# Patient Record
Sex: FEMALE
[De-identification: names, ages, dates, MRNs, and addresses within clinical notes are randomized per-mention and may not be internally consistent; named-entity substitution may affect disease eponyms.]

---

## 2017-01-17 ENCOUNTER — APPOINTMENT (OUTPATIENT)
Dept: NEPHROLOGY | Facility: CLINIC | Age: 67
End: 2017-01-17

## 2017-01-17 VITALS
SYSTOLIC BLOOD PRESSURE: 150 MMHG | DIASTOLIC BLOOD PRESSURE: 80 MMHG | RESPIRATION RATE: 18 BRPM | WEIGHT: 125 LBS | HEART RATE: 80 BPM | BODY MASS INDEX: 18.46 KG/M2 | TEMPERATURE: 98 F

## 2017-03-27 ENCOUNTER — APPOINTMENT (OUTPATIENT)
Dept: NEPHROLOGY | Facility: CLINIC | Age: 67
End: 2017-03-27

## 2017-03-27 VITALS
WEIGHT: 128 LBS | DIASTOLIC BLOOD PRESSURE: 70 MMHG | SYSTOLIC BLOOD PRESSURE: 130 MMHG | BODY MASS INDEX: 18.9 KG/M2 | HEART RATE: 80 BPM | TEMPERATURE: 98 F

## 2017-06-27 ENCOUNTER — APPOINTMENT (OUTPATIENT)
Dept: NEPHROLOGY | Facility: CLINIC | Age: 67
End: 2017-06-27

## 2017-06-27 VITALS
SYSTOLIC BLOOD PRESSURE: 120 MMHG | TEMPERATURE: 98 F | RESPIRATION RATE: 18 BRPM | HEART RATE: 76 BPM | BODY MASS INDEX: 15.95 KG/M2 | WEIGHT: 108 LBS | DIASTOLIC BLOOD PRESSURE: 70 MMHG

## 2017-06-27 DIAGNOSIS — N60.01 SOLITARY CYST OF RIGHT BREAST: ICD-10-CM

## 2017-07-20 ENCOUNTER — RX RENEWAL (OUTPATIENT)
Age: 67
End: 2017-07-20

## 2017-07-28 ENCOUNTER — RX RENEWAL (OUTPATIENT)
Age: 67
End: 2017-07-28

## 2017-10-01 ENCOUNTER — TRANSCRIPTION ENCOUNTER (OUTPATIENT)
Age: 67
End: 2017-10-01

## 2017-10-03 ENCOUNTER — APPOINTMENT (OUTPATIENT)
Dept: NEPHROLOGY | Facility: CLINIC | Age: 67
End: 2017-10-03
Payer: COMMERCIAL

## 2017-10-03 VITALS
WEIGHT: 126 LBS | TEMPERATURE: 98 F | BODY MASS INDEX: 18.61 KG/M2 | DIASTOLIC BLOOD PRESSURE: 70 MMHG | RESPIRATION RATE: 18 BRPM | SYSTOLIC BLOOD PRESSURE: 110 MMHG

## 2017-10-03 PROCEDURE — 99214 OFFICE O/P EST MOD 30 MIN: CPT

## 2017-12-11 ENCOUNTER — RX RENEWAL (OUTPATIENT)
Age: 67
End: 2017-12-11

## 2018-01-31 ENCOUNTER — APPOINTMENT (OUTPATIENT)
Dept: NEPHROLOGY | Facility: CLINIC | Age: 68
End: 2018-01-31
Payer: COMMERCIAL

## 2018-01-31 VITALS
RESPIRATION RATE: 18 BRPM | TEMPERATURE: 98 F | WEIGHT: 126 LBS | DIASTOLIC BLOOD PRESSURE: 70 MMHG | HEART RATE: 66 BPM | SYSTOLIC BLOOD PRESSURE: 120 MMHG | BODY MASS INDEX: 18.61 KG/M2

## 2018-01-31 PROCEDURE — 99214 OFFICE O/P EST MOD 30 MIN: CPT

## 2018-02-06 ENCOUNTER — RX RENEWAL (OUTPATIENT)
Age: 68
End: 2018-02-06

## 2018-04-16 ENCOUNTER — RX RENEWAL (OUTPATIENT)
Age: 68
End: 2018-04-16

## 2018-05-08 ENCOUNTER — APPOINTMENT (OUTPATIENT)
Dept: NEPHROLOGY | Facility: CLINIC | Age: 68
End: 2018-05-08
Payer: COMMERCIAL

## 2018-05-08 VITALS
SYSTOLIC BLOOD PRESSURE: 120 MMHG | DIASTOLIC BLOOD PRESSURE: 70 MMHG | BODY MASS INDEX: 18.61 KG/M2 | HEART RATE: 76 BPM | TEMPERATURE: 98.6 F | WEIGHT: 126 LBS

## 2018-05-08 DIAGNOSIS — N28.89 HYPERTENSION SECONDARY TO OTHER RENAL DISORDERS: ICD-10-CM

## 2018-05-08 DIAGNOSIS — N39.0 URINARY TRACT INFECTION, SITE NOT SPECIFIED: ICD-10-CM

## 2018-05-08 DIAGNOSIS — I15.1 HYPERTENSION SECONDARY TO OTHER RENAL DISORDERS: ICD-10-CM

## 2018-05-08 DIAGNOSIS — R31.9 URINARY TRACT INFECTION, SITE NOT SPECIFIED: ICD-10-CM

## 2018-05-08 PROCEDURE — 99214 OFFICE O/P EST MOD 30 MIN: CPT

## 2018-05-08 RX ORDER — CIPROFLOXACIN HYDROCHLORIDE 250 MG/1
250 TABLET, FILM COATED ORAL
Qty: 10 | Refills: 3 | Status: DISCONTINUED | COMMUNITY
Start: 2017-01-17 | End: 2018-05-08

## 2018-05-08 RX ORDER — AZITHROMYCIN DIHYDRATE 250 MG/1
250 TABLET, FILM COATED ORAL
Qty: 1 | Refills: 1 | Status: DISCONTINUED | COMMUNITY
Start: 2017-03-27 | End: 2018-05-08

## 2018-05-08 RX ORDER — ESTRADIOL 1 MG/1
1 TABLET ORAL
Qty: 90 | Refills: 0 | Status: ACTIVE | COMMUNITY
Start: 2018-01-10

## 2018-05-08 RX ORDER — PROGESTERONE 200 MG/1
200 CAPSULE ORAL
Qty: 30 | Refills: 0 | Status: ACTIVE | COMMUNITY
Start: 2018-02-26

## 2018-06-16 ENCOUNTER — RX RENEWAL (OUTPATIENT)
Age: 68
End: 2018-06-16

## 2018-09-11 ENCOUNTER — APPOINTMENT (OUTPATIENT)
Dept: NEPHROLOGY | Facility: CLINIC | Age: 68
End: 2018-09-11
Payer: COMMERCIAL

## 2018-09-11 VITALS — SYSTOLIC BLOOD PRESSURE: 118 MMHG | HEART RATE: 75 BPM | RESPIRATION RATE: 16 BRPM | DIASTOLIC BLOOD PRESSURE: 65 MMHG

## 2018-09-11 PROCEDURE — 99214 OFFICE O/P EST MOD 30 MIN: CPT

## 2019-03-26 ENCOUNTER — APPOINTMENT (OUTPATIENT)
Dept: NEPHROLOGY | Facility: CLINIC | Age: 69
End: 2019-03-26

## 2019-05-24 ENCOUNTER — CLINICAL ADVICE (OUTPATIENT)
Age: 69
End: 2019-05-24

## 2019-11-19 ENCOUNTER — RX RENEWAL (OUTPATIENT)
Age: 69
End: 2019-11-19

## 2019-12-31 ENCOUNTER — TRANSCRIPTION ENCOUNTER (OUTPATIENT)
Age: 69
End: 2019-12-31

## 2020-02-18 ENCOUNTER — APPOINTMENT (OUTPATIENT)
Dept: NEPHROLOGY | Facility: CLINIC | Age: 70
End: 2020-02-18
Payer: COMMERCIAL

## 2020-02-18 DIAGNOSIS — E87.1 HYPO-OSMOLALITY AND HYPONATREMIA: ICD-10-CM

## 2020-02-18 DIAGNOSIS — R82.81 PYURIA: ICD-10-CM

## 2020-02-18 DIAGNOSIS — I10 ESSENTIAL (PRIMARY) HYPERTENSION: ICD-10-CM

## 2020-02-18 DIAGNOSIS — E78.00 PURE HYPERCHOLESTEROLEMIA, UNSPECIFIED: ICD-10-CM

## 2020-02-18 DIAGNOSIS — K21.9 GASTRO-ESOPHAGEAL REFLUX DISEASE W/OUT ESOPHAGITIS: ICD-10-CM

## 2020-02-18 PROCEDURE — 99214 OFFICE O/P EST MOD 30 MIN: CPT

## 2020-02-18 NOTE — PHYSICAL EXAM
[General Appearance - In No Acute Distress] : in no acute distress [General Appearance - Alert] : alert [Sclera] : the sclera and conjunctiva were normal [Extraocular Movements] : extraocular movements were intact [PERRL With Normal Accommodation] : pupils were equal in size, round, and reactive to light [Oropharynx] : the oropharynx was normal [Outer Ear] : the ears and nose were normal in appearance [Neck Appearance] : the appearance of the neck was normal [Neck Cervical Mass (___cm)] : no neck mass was observed [Jugular Venous Distention Increased] : there was no jugular-venous distention [Thyroid Diffuse Enlargement] : the thyroid was not enlarged [Thyroid Nodule] : there were no palpable thyroid nodules [Heart Rate And Rhythm] : heart rate was normal and rhythm regular [Auscultation Breath Sounds / Voice Sounds] : lungs were clear to auscultation bilaterally [Murmurs] : no murmurs [Heart Sounds] : normal S1 and S2 [Heart Sounds Gallop] : no gallops [Edema] : there was no peripheral edema [Full Pulse] : the pedal pulses are present [Heart Sounds Pericardial Friction Rub] : no pericardial rub [Bowel Sounds] : normal bowel sounds [Abdomen Soft] : soft [Abdomen Mass (___ Cm)] : no abdominal mass palpated [Abdomen Tenderness] : non-tender [No CVA Tenderness] : no ~M costovertebral angle tenderness [No Spinal Tenderness] : no spinal tenderness [Involuntary Movements] : no involuntary movements were seen [Musculoskeletal - Swelling] : no joint swelling seen [Skin Color & Pigmentation] : normal skin color and pigmentation [Cranial Nerves] : cranial nerves 2-12 were intact [Skin Turgor] : normal skin turgor [] : no rash [Impaired Insight] : insight and judgment were intact [No Focal Deficits] : no focal deficits [Oriented To Time, Place, And Person] : oriented to person, place, and time [Affect] : the affect was normal

## 2020-03-09 NOTE — ASSESSMENT
[FreeTextEntry1] : 68yo F with longstanding HTN on HRT, mild intermittent hyponatremia likely thiazide related and new flank pain here for f/u: \par \par # HTN - well controlled, on HRT\par egfr 89 Cr 0.79 - will review 24hr CrCl\par Sterile pyuria - resolved\par \par # Hyponatremia - euvolemic, drinks 2 500mL bottles of water, a coffee and a glass of grapefruit juice daily. \par in 2018 Na 134, 132 last week, mild and stable, likely 2/2 thiazide, repeat in 3 months along a osmolality/na in urine\par and TFTs\par \par # R Flank pain w nausea/ indigestion per pt resolved with PPI after endoscopy showing gastritis, her GI did a sonogram, will request records.\par \par # HLD - on lovastatin\par \par Addendum: 24hr urine 1.8L volume (ideal is 2L) but very good CrCl at 108 - normal renal fx, no proteinuria.

## 2020-03-09 NOTE — HISTORY OF PRESENT ILLNESS
[FreeTextEntry1] : 70yo F with longstanding HTN on HRT, mild intermittent hyponatremia likely thiazide related and new flank pain here for f/u: \par \par PCP Dr Turner Joyce\par GI Darren Sergo\par \par R flank pain a few months ago lasting more than a month, saw multiple doctors, Xray done, diagnosed with OA and R hip bursitis. She found associated with this pain was indigestion and nausea. Pain was constant for 1 month, varying in severity. Worse first thing in the morning, improves with breakfast. Started on PPI by her GI and she started feeling much better. R back pain is improved as are indigestive sx. She's found trigger foods that she's not eating anymore which has helped a lot as well. Potato chips, fatty foods, breakfast sweets at work. \par Lost 4-5lbs with dietary changes after gaining it from being more sedentary after going back to work and eating  a lot of junk food.\par \par Dry eyes. Told by dentist her mouth looked dry and to drink more water but she hasn't increased intake.\par Wakes up once nightly to urinate, occasionally when she's having insomnia she notes up to 4x nocturia w/o change in fluid intake. \par \par Hx\par Cerebral aneursym in 2004 repaired. 2005 R hemicolectomy (about 8 inches laparoscopy) for suspicious polyps. Getting surveillance, last in 2016 and told she has to come back in 10yrs.  \par On HCT with estradiol progesterone for >15yrs for menopausal sx. \par Vegetarian. \par \par Social: , active with her horse. currently exdelphine is living with her. \par OTC: Vit E, Fish Oil, Glucosamine Chondritis, Ocuvite\par

## 2020-12-16 PROBLEM — N39.0 URINARY TRACT INFECTION WITH HEMATURIA: Status: RESOLVED | Noted: 2017-01-17 | Resolved: 2020-12-16

## 2021-02-16 ENCOUNTER — APPOINTMENT (OUTPATIENT)
Dept: NEPHROLOGY | Facility: CLINIC | Age: 71
End: 2021-02-16